# Patient Record
(demographics unavailable — no encounter records)

---

## 2019-03-20 NOTE — NUR
AT 0705 - RECEIVED PATIENT FROM NIGHT NURSE. PATIENT AWAKE, ALERT AND
ORIENTED. MONITOR SHOWING SINUS RHYTHM; RATE 70'S. DENIES ANY CHEST PAIN.  IV
INFUSING NS AT 100ML/HR.
AT 0730 - SITTING ON SIDE OF BED EATING BREAKFAST.
AT 0737 - SEEN BY DR AYON DURING MORNING ROUNDS. DR CISNEROS, DR JEAN-BAPTISTE, HERNANDEZ SKAGGS AND MYSELF AS PRIMARY NURSE ALSO PRESENT. DR AYON SPOKE WITH
PATIENT IN Samoan. PATIENT VERBALIZED WISH TO LEAVE AMA TODAY.
AT 1100 - PATIENT PROVIDED WITH PRINTED INFORMATION OF CHOLSTEROL AND DIET.
PATIENT RESPONSIVE TO LIFESTYLE MODIFICATIONS. PATIENT ASKING ABOUT LEAVING
HOSPITAL.
AT 1130 - DR BRAMBILA HAS SPOKEN WITH PATIENT AND AMA FORM SIGNED. IV INFUSION
DISCONTINUED AND PATIENT TAKEN OFF CARDIAC MONITORING. AWAITING PRESCRIPTION
AS PROMISED BY DR AYON.
AT 1150 - PATIENT PROVIDED WITH PRESCRIPTION FOR IBUPROFEN 600 MG. INSTRUCTED
TO FOLLOW-UP WITH PCP. IV CATHETER REMOVED ITNACT. PATIENT LEFT AMA WITH
FAMILY. ESCORTED AMBULATORY TO FRONT LOBBY.
DR. MANCIA AND MEDICAL STUDENT IN TO SEE PT.
ECHO NOT DONE PT DISCHARGED
IV ESTABLISHED BLOOD DRAWN TO LAB,CARDIAC MONITOR IN SR,RESP. EASY
MED STUDENT AT BEDSIDE FOR INTERVIEW/EXAM. DAUGHTER AT BEDSIDE.
PT AMBULATORY TO LOBBY WITH FAMILY. PT A&O X4, BREATHING E/U. PT AWAITING ROOM
AVAILABILITY. EKG DONE IN TRIAGE AND SHOWN TO DOCTOR
PT RESTING IN BED WITH EYES CLOSED. DAUGHTER AT BEDSIDE. DENIES PAIN AT THIS
TIME. WILL CONTINUE TO MONITOR.
PT RESTING IN BED WITH EYES CLOSED. DAUGHTER AT BEDSIDE. NO SIGNS OF DISTRESS
NOTED. BREATHING EVEN/UNLABORED ON RA. CALL LIGHT WITHIN REACH, BED AT LOWEST
POSITION. WILL CONTINUE TO MONITOR.
PT RESTING IN BED. DAUGHTER AT BEDSIDE. DENIES ANY CP OR DISCOMFORT AT THIS
TIME. DENIES RESP DISTRESS OR ANXIETY. , NO COVERAGE. CALL LIGHT WITHIN
REACH, BED AT LOWEST POSITION. WILL ENDORSE TO DAY NURSE.
REC'D PT FROM ER VIA [facility]. PT IS AAOX4. TELE #8 SR WITH DEPRESSED T-WAVE.
C/O 8/10 CP. LUNG SOUNDS CLEAR. NO SOB NOTED. IV NOTED TO LAC. INTACT AND
PATENT. ORIENTED PT TO CALL LIGHT. BED IN LOWEST POSITION. WILL ENDORSE TO
PRIMARY RN.
REC'D REPORT FROM BENIGNO DIALLO. DAUGHTER AT BEDSIDE. PT AAOX4, SPEECH CLEAR,
FOLLOWS COMMANDS. [place] SPEAKING. REPORTS SOME ANXIETY. ON TELE 8 READING
NSR. C/O 8/10 CP EXACERBATED BY MOVEMENT, DEEP BREATHS, AND PALPATION. WILL
GIVE MORPHINE PER ORDER. DENIES RESP DISTRESS OR SOB. BREATHING
EVEN/UNLABORED ON RA. NO EDEMA NOTED. DENIES ABD PAIN, TENDERNESS, OR N/V.
VOIDING FREELY. AMBULATORY. SKIN INTACT. PT ORIENTED TO SURROUNDING AND
DEVICES. CALL LIGHT WITHIN REACH, BED AT LOWEST POSITION. WILL CONTINUE TO
MONITOR.
RECEIVED AWAKE ALERT ORIENTED, C/O LT UPPER CHEST PAIN INCREASES ON
ACTIVITY,TAKING DEEP BREATHS AND TENDER TO TOUCH SINCE YESTERDAY,
RECEIVED REPORT FROM AHMED RN. PT RESTING IN BED WITH EYES CLOSED. NO SIGNS OF
DISTRESS NOTED AT THIS TIME.
REPORT GIVEN TO CLIFTON DIALLO.
How Severe Are Your Warts?: moderate
Is This A New Presentation, Or A Follow-Up?: Follow Up Dane